# Patient Record
Sex: FEMALE | Race: ASIAN | NOT HISPANIC OR LATINO | URBAN - METROPOLITAN AREA
[De-identification: names, ages, dates, MRNs, and addresses within clinical notes are randomized per-mention and may not be internally consistent; named-entity substitution may affect disease eponyms.]

---

## 2020-10-30 ENCOUNTER — OFFICE VISIT (OUTPATIENT)
Dept: URBAN - METROPOLITAN AREA CLINIC 63 | Facility: CLINIC | Age: 70
End: 2020-10-30

## 2020-11-04 ENCOUNTER — OFFICE VISIT (OUTPATIENT)
Dept: URBAN - METROPOLITAN AREA CLINIC 63 | Facility: CLINIC | Age: 70
End: 2020-11-04

## 2020-11-17 ENCOUNTER — OFFICE VISIT (OUTPATIENT)
Dept: URBAN - METROPOLITAN AREA SURGERY CENTER 4 | Facility: SURGERY CENTER | Age: 70
End: 2020-11-17

## 2020-12-02 ENCOUNTER — OFFICE VISIT (OUTPATIENT)
Dept: URBAN - METROPOLITAN AREA CLINIC 63 | Facility: CLINIC | Age: 70
End: 2020-12-02

## 2021-05-12 ENCOUNTER — OFFICE VISIT (OUTPATIENT)
Dept: URBAN - METROPOLITAN AREA CLINIC 63 | Facility: CLINIC | Age: 71
End: 2021-05-12

## 2021-06-10 ENCOUNTER — OFFICE VISIT (OUTPATIENT)
Dept: URBAN - METROPOLITAN AREA CLINIC 63 | Facility: CLINIC | Age: 71
End: 2021-06-10

## 2021-07-06 ENCOUNTER — OFFICE VISIT (OUTPATIENT)
Dept: URBAN - METROPOLITAN AREA SURGERY CENTER 4 | Facility: SURGERY CENTER | Age: 71
End: 2021-07-06

## 2021-07-22 ENCOUNTER — OFFICE VISIT (OUTPATIENT)
Dept: URBAN - METROPOLITAN AREA CLINIC 63 | Facility: CLINIC | Age: 71
End: 2021-07-22

## 2021-10-08 ENCOUNTER — OFFICE VISIT (OUTPATIENT)
Dept: URBAN - METROPOLITAN AREA SURGERY CENTER 4 | Facility: SURGERY CENTER | Age: 71
End: 2021-10-08

## 2021-10-12 LAB — PATHOLOGY (INDENTED REPORT): (no result)

## 2021-10-22 ENCOUNTER — OFFICE VISIT (OUTPATIENT)
Dept: URBAN - METROPOLITAN AREA CLINIC 63 | Facility: CLINIC | Age: 71
End: 2021-10-22

## 2021-12-22 ENCOUNTER — OFFICE VISIT (OUTPATIENT)
Dept: URBAN - METROPOLITAN AREA CLINIC 63 | Facility: CLINIC | Age: 71
End: 2021-12-22

## 2022-07-09 ENCOUNTER — TELEPHONE ENCOUNTER (OUTPATIENT)
Dept: URBAN - METROPOLITAN AREA CLINIC 121 | Facility: CLINIC | Age: 72
End: 2022-07-09

## 2022-07-09 RX ORDER — OMEPRAZOLE 20 MG/1
CAPSULE, DELAYED RELEASE ORAL
Refills: 0 | OUTPATIENT
Start: 2020-11-04 | End: 2021-06-10

## 2022-07-09 RX ORDER — BISOPROLOL FUMARATE AND HYDROCHLOROTHIAZIDE 2.5; 6.25 MG/1; MG/1
TABLET ORAL
Refills: 0 | OUTPATIENT
Start: 2021-08-31 | End: 2021-10-05

## 2022-07-09 RX ORDER — CALCIUM NO.38/D3/MAG/BORON ASP 500MG/15ML
LIQUID (ML) ORAL
Refills: 0 | OUTPATIENT
Start: 2019-11-19 | End: 2019-11-19

## 2022-07-09 RX ORDER — BISOPROLOL FUMARATE AND HYDROCHLOROTHIAZIDE 2.5; 6.25 MG/1; MG/1
TABLET ORAL ONCE A DAY
Refills: 0 | OUTPATIENT
Start: 2021-10-05 | End: 2021-10-22

## 2022-07-09 RX ORDER — CALCIUM NO.38/D3/MAG/BORON ASP 500MG/15ML
LIQUID (ML) ORAL
Refills: 0 | OUTPATIENT
Start: 2019-07-02 | End: 2019-11-19

## 2022-07-09 RX ORDER — ASPIRIN 81 MG/1
TABLET ORAL ONCE A DAY
Refills: 0 | OUTPATIENT
Start: 2021-10-22 | End: 2021-10-22

## 2022-07-09 RX ORDER — FAMOTIDINE 40 MG/1
USE AS DIRECTED TAKE ONE TABLET WITH DINNER TABLET, FILM COATED ORAL
Refills: 1 | OUTPATIENT
Start: 2021-10-08 | End: 2021-11-30

## 2022-07-09 RX ORDER — ASPIRIN 81 MG/1
TABLET ORAL ONCE A DAY
Refills: 0 | OUTPATIENT
Start: 2021-10-05 | End: 2021-10-22

## 2022-07-09 RX ORDER — METOPROLOL SUCCINATE 25 MG/1
TABLET, EXTENDED RELEASE ORAL
Refills: 0 | OUTPATIENT
Start: 2020-10-11 | End: 2021-06-10

## 2022-07-09 RX ORDER — OMEPRAZOLE 20 MG/1
CAPSULE, DELAYED RELEASE ORAL ONCE A DAY
Refills: 0 | OUTPATIENT
Start: 2021-10-05 | End: 2021-10-22

## 2022-07-09 RX ORDER — OMEGA-3 FATTY ACIDS/FISH OIL 360-1200MG
CAPSULE ORAL ONCE A DAY
Refills: 0 | OUTPATIENT
Start: 2021-10-05 | End: 2021-10-22

## 2022-07-09 RX ORDER — OMEGA-3 FATTY ACIDS/FISH OIL 360-1200MG
CAPSULE ORAL ONCE A DAY
Refills: 0 | OUTPATIENT
Start: 2021-06-10 | End: 2021-10-05

## 2022-07-09 RX ORDER — PANTOPRAZOLE SODIUM 40 MG/1
TABLET, DELAYED RELEASE ORAL
Refills: 0 | OUTPATIENT
Start: 2021-09-26 | End: 2021-10-05

## 2022-07-09 RX ORDER — ATORVASTATIN CALCIUM 10 MG/1
TABLET, FILM COATED ORAL ONCE A DAY
Refills: 0 | OUTPATIENT
Start: 2021-10-05 | End: 2021-10-22

## 2022-07-09 RX ORDER — FLUOCINOLONE ACETONIDE OIL 0.11 MG/ML
OIL AURICULAR (OTIC)
Refills: 0 | OUTPATIENT
Start: 2020-08-11 | End: 2021-06-10

## 2022-07-10 ENCOUNTER — TELEPHONE ENCOUNTER (OUTPATIENT)
Dept: URBAN - METROPOLITAN AREA CLINIC 121 | Facility: CLINIC | Age: 72
End: 2022-07-10

## 2022-07-10 RX ORDER — BISOPROLOL FUMARATE AND HYDROCHLOROTHIAZIDE 2.5; 6.25 MG/1; MG/1
TABLET ORAL ONCE A DAY
Refills: 0 | Status: ACTIVE | COMMUNITY
Start: 2021-10-22

## 2022-07-10 RX ORDER — OMEPRAZOLE 20 MG/1
TAKE ONE CAPSULE PO ONCE DAILY 30 MINUTES BEFORE BREAKFAST CAPSULE, DELAYED RELEASE ORAL
Refills: 1 | Status: ACTIVE | COMMUNITY
Start: 2020-11-04

## 2022-07-10 RX ORDER — SODIUM SULFATE, POTASSIUM SULFATE, MAGNESIUM SULFATE 17.5; 3.13; 1.6 G/ML; G/ML; G/ML
TAKE AS DIRECTED SOLUTION, CONCENTRATE ORAL TAKE AS DIRECTED
Refills: 0 | Status: ACTIVE | COMMUNITY
Start: 2015-09-02

## 2022-07-10 RX ORDER — PANTOPRAZOLE SODIUM 40 MG/1
TABLET, DELAYED RELEASE ORAL ONCE A DAY
Refills: 0 | Status: ACTIVE | COMMUNITY
Start: 2021-10-22

## 2022-07-10 RX ORDER — OMEGA-3 FATTY ACIDS/FISH OIL 360-1200MG
CAPSULE ORAL ONCE A DAY
Refills: 0 | Status: ACTIVE | COMMUNITY
Start: 2021-10-22

## 2022-07-10 RX ORDER — ONDANSETRON HYDROCHLORIDE 4 MG/2ML
TAKE ONE TABLET PO 30 MINUTES BEFORE EACH BOTTLE OF MAGNESIUM CITRATE FOR COLONOSCOPY PREP INJECTION, SOLUTION INTRAMUSCULAR; INTRAVENOUS
Refills: 0 | Status: ACTIVE | COMMUNITY
Start: 2019-08-22

## 2022-07-10 RX ORDER — ONDANSETRON HYDROCHLORIDE 4 MG/1
USE AS DIRECTED TAKE ONE TABLET 30 MINUTES BEFORE EACH BOTTLE OF MAGNESIUM CITRATE FOR COLONOSCOPY PREP TABLET, FILM COATED ORAL
Refills: 0 | Status: ACTIVE | COMMUNITY
Start: 2021-06-15

## 2022-07-10 RX ORDER — ATORVASTATIN CALCIUM 10 MG/1
TABLET, FILM COATED ORAL ONCE A DAY
Refills: 0 | Status: ACTIVE | COMMUNITY
Start: 2021-10-22

## 2022-07-10 RX ORDER — FAMOTIDINE 40 MG/1
USE AS DIRECTED TAKE ONE TABLET WITH DINNER TABLET, FILM COATED ORAL
Refills: 1 | Status: ACTIVE | COMMUNITY
Start: 2021-11-30

## 2022-07-10 RX ORDER — ONDANSETRON HYDROCHLORIDE 4 MG/1
TAKE ONE TABLET 30 MINUTES BEFORE EACH BOTTLE OF MAGNESIUM CITRATE FOR COLONOSCOPY PREP TABLET, FILM COATED ORAL
Refills: 0 | Status: ACTIVE | COMMUNITY
Start: 2020-11-04

## 2022-07-10 RX ORDER — OMEPRAZOLE 20 MG/1
CAPSULE, DELAYED RELEASE ORAL ONCE A DAY
Refills: 0 | Status: ACTIVE | COMMUNITY
Start: 2021-10-22

## 2022-07-30 ENCOUNTER — TELEPHONE ENCOUNTER (OUTPATIENT)
Age: 72
End: 2022-07-30

## 2022-07-31 ENCOUNTER — TELEPHONE ENCOUNTER (OUTPATIENT)
Age: 72
End: 2022-07-31

## 2022-09-13 ENCOUNTER — APPOINTMENT (RX ONLY)
Dept: URBAN - METROPOLITAN AREA CLINIC 116 | Facility: CLINIC | Age: 72
Setting detail: DERMATOLOGY
End: 2022-09-13

## 2022-09-13 DIAGNOSIS — Z71.89 OTHER SPECIFIED COUNSELING: ICD-10-CM

## 2022-09-13 DIAGNOSIS — D22 MELANOCYTIC NEVI: ICD-10-CM

## 2022-09-13 DIAGNOSIS — L21.8 OTHER SEBORRHEIC DERMATITIS: ICD-10-CM

## 2022-09-13 DIAGNOSIS — L81.4 OTHER MELANIN HYPERPIGMENTATION: ICD-10-CM

## 2022-09-13 DIAGNOSIS — I83.9 ASYMPTOMATIC VARICOSE VEINS OF LOWER EXTREMITIES: ICD-10-CM

## 2022-09-13 DIAGNOSIS — L29.89 OTHER PRURITUS: ICD-10-CM

## 2022-09-13 PROBLEM — I83.93 ASYMPTOMATIC VARICOSE VEINS OF BILATERAL LOWER EXTREMITIES: Status: ACTIVE | Noted: 2022-09-13

## 2022-09-13 PROBLEM — L29.8 OTHER PRURITUS: Status: ACTIVE | Noted: 2022-09-13

## 2022-09-13 PROBLEM — D22.5 MELANOCYTIC NEVI OF TRUNK: Status: ACTIVE | Noted: 2022-09-13

## 2022-09-13 PROCEDURE — 99203 OFFICE O/P NEW LOW 30 MIN: CPT

## 2022-09-13 PROCEDURE — ? COUNSELING

## 2022-09-13 PROCEDURE — ? RECOMMENDATIONS

## 2022-09-13 ASSESSMENT — LOCATION DETAILED DESCRIPTION DERM
LOCATION DETAILED: LEFT PROXIMAL DORSAL FOREARM
LOCATION DETAILED: LEFT SUPERIOR MEDIAL UPPER BACK
LOCATION DETAILED: RIGHT CENTRAL EYEBROW
LOCATION DETAILED: LEFT PROXIMAL CALF
LOCATION DETAILED: RIGHT MEDIAL FRONTAL SCALP
LOCATION DETAILED: RIGHT PROXIMAL DORSAL FOREARM
LOCATION DETAILED: LEFT CENTRAL EYEBROW
LOCATION DETAILED: LEFT CENTRAL MALAR CHEEK
LOCATION DETAILED: RIGHT PROXIMAL CALF

## 2022-09-13 ASSESSMENT — LOCATION SIMPLE DESCRIPTION DERM
LOCATION SIMPLE: LEFT EYEBROW
LOCATION SIMPLE: LEFT FOREARM
LOCATION SIMPLE: LEFT CHEEK
LOCATION SIMPLE: RIGHT FOREARM
LOCATION SIMPLE: RIGHT SCALP
LOCATION SIMPLE: RIGHT CALF
LOCATION SIMPLE: RIGHT EYEBROW
LOCATION SIMPLE: LEFT UPPER BACK
LOCATION SIMPLE: LEFT CALF

## 2022-09-13 ASSESSMENT — LOCATION ZONE DERM
LOCATION ZONE: LEG
LOCATION ZONE: FACE
LOCATION ZONE: SCALP
LOCATION ZONE: TRUNK
LOCATION ZONE: ARM

## 2022-09-13 NOTE — PROCEDURE: MIPS QUALITY
Detail Level: Zone
Additional Notes: Patient does not have updated medication list available today
Quality 130: Documentation Of Current Medications In The Medical Record: Documentation of a medical reason(s) for not documenting, updating, or reviewing the patientâs current medications list (e.g., patient is in an urgent or emergent medical situation)

## 2022-09-13 NOTE — PROCEDURE: COUNSELING
Detail Level: Generalized
Skin Checks Recommendations: Watch for any abnormal moles.
Sunscreen Recommendations: Spf 30 to 50
Detail Level: Simple
Detail Level: Zone
Sunscreen Recommendations: SPF with zinc 30- 48
Shampoo Recommendations: Dandruff shampoos

## 2022-09-13 NOTE — PROCEDURE: RECOMMENDATIONS
Recommendations (Free Text): Scalpicin maximum strength topical solution. Apply to dry scalp daily as needed .
Render Risk Assessment In Note?: yes
Recommendation Preamble: The following recommendations were made during visit: moisturizing regularly and exfoliating
Detail Level: Simple
Recommendations (Free Text): Apply Scalpicin daily as needed to affected areas in eyebrows.
Recommendations (Free Text): Recommend patient to see  for further evaluation and treatment.

## 2022-10-18 ENCOUNTER — APPOINTMENT (RX ONLY)
Dept: URBAN - METROPOLITAN AREA CLINIC 116 | Facility: CLINIC | Age: 72
Setting detail: DERMATOLOGY
End: 2022-10-18

## 2022-10-18 DIAGNOSIS — B35.2 TINEA MANUUM: ICD-10-CM

## 2022-10-18 PROBLEM — L08.9 LOCAL INFECTION OF THE SKIN AND SUBCUTANEOUS TISSUE, UNSPECIFIED: Status: ACTIVE | Noted: 2022-10-18

## 2022-10-18 PROCEDURE — ? COUNSELING

## 2022-10-18 PROCEDURE — ? PRESCRIPTION

## 2022-10-18 PROCEDURE — 99213 OFFICE O/P EST LOW 20 MIN: CPT

## 2022-10-18 RX ORDER — CICLOPIROX OLAMINE 7.7 MG/G
CREAM TOPICAL
Qty: 30 | Refills: 0 | Status: ERX | COMMUNITY
Start: 2022-10-18

## 2022-10-18 RX ADMIN — CICLOPIROX OLAMINE: 7.7 CREAM TOPICAL at 00:00

## 2022-10-18 ASSESSMENT — LOCATION DETAILED DESCRIPTION DERM: LOCATION DETAILED: RIGHT THENAR EMINENCE

## 2022-10-18 ASSESSMENT — LOCATION ZONE DERM: LOCATION ZONE: HAND

## 2022-10-18 ASSESSMENT — LOCATION SIMPLE DESCRIPTION DERM: LOCATION SIMPLE: RIGHT HAND

## 2022-11-02 ENCOUNTER — TELEPHONE ENCOUNTER (OUTPATIENT)
Dept: URBAN - METROPOLITAN AREA CLINIC 63 | Facility: CLINIC | Age: 72
End: 2022-11-02

## 2022-11-02 RX ORDER — PANTOPRAZOLE SODIUM 40 MG/1
1 TABLET TABLET, DELAYED RELEASE ORAL ONCE A DAY
Qty: 90 TABLET | Refills: 3
Start: 2021-10-22

## 2022-11-15 RX ORDER — CICLOPIROX OLAMINE 7.7 MG/G
CREAM TOPICAL
Qty: 30 | Refills: 0 | Status: ERX

## 2023-03-21 ENCOUNTER — WEB ENCOUNTER (OUTPATIENT)
Dept: URBAN - METROPOLITAN AREA CLINIC 63 | Facility: CLINIC | Age: 73
End: 2023-03-21

## 2023-03-23 PROBLEM — 302914006: Status: ACTIVE | Noted: 2023-03-23

## 2023-03-23 PROBLEM — 428283002: Status: ACTIVE | Noted: 2023-03-23

## 2023-03-23 PROBLEM — 13200003: Status: ACTIVE | Noted: 2023-03-23

## 2023-03-24 ENCOUNTER — WEB ENCOUNTER (OUTPATIENT)
Dept: URBAN - METROPOLITAN AREA CLINIC 63 | Facility: CLINIC | Age: 73
End: 2023-03-24

## 2023-03-24 ENCOUNTER — LAB OUTSIDE AN ENCOUNTER (OUTPATIENT)
Dept: URBAN - METROPOLITAN AREA CLINIC 63 | Facility: CLINIC | Age: 73
End: 2023-03-24

## 2023-03-24 ENCOUNTER — OFFICE VISIT (OUTPATIENT)
Dept: URBAN - METROPOLITAN AREA CLINIC 63 | Facility: CLINIC | Age: 73
End: 2023-03-24
Payer: MEDICARE

## 2023-03-24 VITALS
DIASTOLIC BLOOD PRESSURE: 74 MMHG | HEART RATE: 90 BPM | WEIGHT: 114.2 LBS | BODY MASS INDEX: 17.92 KG/M2 | SYSTOLIC BLOOD PRESSURE: 110 MMHG | HEIGHT: 67 IN | OXYGEN SATURATION: 98 % | TEMPERATURE: 97.3 F

## 2023-03-24 DIAGNOSIS — K22.70 BARRETT'S ESOPHAGUS WITHOUT DYSPLASIA: ICD-10-CM

## 2023-03-24 DIAGNOSIS — K27.9 PUD (PEPTIC ULCER DISEASE): ICD-10-CM

## 2023-03-24 DIAGNOSIS — Z86.010 PERSONAL HISTORY OF COLONIC POLYPS: ICD-10-CM

## 2023-03-24 PROCEDURE — 99213 OFFICE O/P EST LOW 20 MIN: CPT | Performed by: PHYSICIAN ASSISTANT

## 2023-03-24 RX ORDER — PANTOPRAZOLE SODIUM 40 MG/1
1 TABLET TABLET, DELAYED RELEASE ORAL ONCE A DAY
Qty: 90 TABLET | Refills: 3 | Status: ACTIVE | COMMUNITY
Start: 2021-10-22

## 2023-03-24 RX ORDER — PANTOPRAZOLE SODIUM 40 MG/1
1 TABLET 30 MINUTES BEFORE BREAKFAST TABLET, DELAYED RELEASE ORAL ONCE A DAY
Qty: 90 TABLET | Refills: 3
Start: 2021-10-22

## 2023-03-24 RX ORDER — METOPROLOL TARTRATE 25 MG/1
1 TABLET WITH FOOD TABLET, FILM COATED ORAL TWICE A DAY
Status: ACTIVE | COMMUNITY

## 2023-03-24 RX ORDER — ATORVASTATIN CALCIUM 10 MG/1
TABLET, FILM COATED ORAL ONCE A DAY
Refills: 0 | Status: ACTIVE | COMMUNITY
Start: 2021-10-22

## 2023-03-24 NOTE — HPI-TODAY'S VISIT:
Araceli is a pleasant 72-year-old female who presents today for a follow up.  History of recurrent diverticulitis with microperforation. 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      EGD dated 10/8/2021 demonstrated a regular Z-line.  Gastritis.  Normal duodenum.  One gastric ulcer biopsied in the gastric antrum. Focal minimal chronic gastritis negative for H. pylori.  Reactive gastropathy.  Lower third esophageal biopsies revealed focal intestinal metaplasia compatible with Aparicio's esophagus but negative for dysplasia or malignancy.    Colonoscopy dated 10/8/2021 demonstrated a 5 mm tubular adenoma removed for the mid sigmoid colon.  Diminutive tubular adenoma removed the mid ascending colon.  Diverticulosis in the sigmoid colon and nonbleeding internal hemorrhoids present upon retroflexion     Labs dated 1/9/2023 showed a normal CBC.  PT/INR normal.  Normal creatinine.  Normal LFTs.   Chronic reflux well controlled on pantoprazole 40 mg qd. Notes 1-2 daily bowel movements without constipation or diarrhea. Denies any nausea, vomiting, heartburn, reflux, dysphagia, odynophagia, hematemesis, coffee ground emesis, abdominal pain, change in bowel habits, abnormal weight loss, BRBPR or melena. Denies any family history of colon cancer or colon polyps. Notes no other GI complaints at this time

## 2023-03-31 ENCOUNTER — LAB OUTSIDE AN ENCOUNTER (OUTPATIENT)
Dept: URBAN - METROPOLITAN AREA CLINIC 63 | Facility: CLINIC | Age: 73
End: 2023-03-31

## 2023-05-02 ENCOUNTER — OFFICE VISIT (OUTPATIENT)
Dept: URBAN - METROPOLITAN AREA SURGERY CENTER 4 | Facility: SURGERY CENTER | Age: 73
End: 2023-05-02

## 2023-05-17 ENCOUNTER — APPOINTMENT (RX ONLY)
Dept: URBAN - METROPOLITAN AREA CLINIC 116 | Facility: CLINIC | Age: 73
Setting detail: DERMATOLOGY
End: 2023-05-17

## 2023-05-17 DIAGNOSIS — I87.2 VENOUS INSUFFICIENCY (CHRONIC) (PERIPHERAL): ICD-10-CM | Status: WORSENING

## 2023-05-17 PROCEDURE — ? RECOMMENDATIONS

## 2023-05-17 PROCEDURE — ? MEDICAL CONSULTATION: VENOUS DISEASE

## 2023-05-17 PROCEDURE — 99204 OFFICE O/P NEW MOD 45 MIN: CPT

## 2023-05-17 PROCEDURE — ? COMPRESSION STOCKING FITTING

## 2023-05-17 ASSESSMENT — LOCATION DETAILED DESCRIPTION DERM
LOCATION DETAILED: RIGHT DISTAL PRETIBIAL REGION
LOCATION DETAILED: LEFT PROXIMAL PRETIBIAL REGION

## 2023-05-17 ASSESSMENT — LOCATION ZONE DERM: LOCATION ZONE: LEG

## 2023-05-17 ASSESSMENT — LOCATION SIMPLE DESCRIPTION DERM
LOCATION SIMPLE: LEFT PRETIBIAL REGION
LOCATION SIMPLE: RIGHT PRETIBIAL REGION

## 2023-05-17 NOTE — PROCEDURE: MEDICAL CONSULTATION: VENOUS DISEASE
Include Left Vcss In Note: Yes
Left Leg Circumference: medium
Left Leg: Peripheral Vascular Disease?: No
Right Leg Inflammation: 0- None
Detail Level: Zone
Right Dorsalis Pedis Pulse: 2 (Easily palpable)
Left Leg Venous Hyperpigmentation: 1- Diffuse, but limited in area, and old (brown)
Right Leg Compression Therapy: 0- None or noncompliant
Left Leg Pain: 3- Daily, severe activity limitation or requiring regular analgesic use
Left Leg Varicose Veins: 2- Multiple: GS varicose veins confined to calf or thigh
Follow Up Instructions:: Discussed during visit conservative management that could include any and all of the following:\\n\\nWearing compression stockings during waking hours, Walking frequently (i.e. ten minutes per hour, every waking hour), Analgesic use as needed such as Tylenol or Ibuprofen, Exercise like walking, biking, yoga and running as tolerated, and Weight management (You should consult your primary provider before making any changes).
Right Leg Venous Hyperpigmentation: 0- None or focal low intensity (tan)
Limitations: **Patient reports continuing daily activities / functions with symptoms and is reported to be worse by days end and/or after activities. Patients symptoms include: cramping at night, pain & restless legs.

## 2023-05-17 NOTE — PROCEDURE: COMPRESSION STOCKING FITTING
Left Ankle: 21
Order Date: 05/17/2023
Right Below Knee: 30.5
Detail Level: Simple
Toe Type: Open
Left Thigh: [address]
Pantihose Type: Part A
Right Thigh: 41.5
Left Below Knee: [id number] [facility]
Stocking Type: Stockings
Strength: 20-30 mmHg
Additional Instructions: Only fitted

## 2023-05-17 NOTE — PROCEDURE: RECOMMENDATIONS
Render Risk Assessment In Note?: yes
Detail Level: Zone
Recommendations (Free Text): The patient shows signs and symptoms of chronic venous hypertension, affecting daily activities; patient will schedule for bilateral lower extremity venous ultrasound for next available appointment. \\nPatient will follow up with Dr. Nara Meyer one week after ultrasound is completed. \\nThe patient was fitted for daily graduated compression stocking(s) (20 - 30mmHg or higher) and counseled in conservative management of avoiding prolonged standing, periodic leg elevation, analgesic use, exercise and weight management. \\n\\nPatient was advised to take OTC magnesium for a few weeks to see if the magnesium helps eliminate cramping.
Recommendation Preamble: The following recommendations were made during the visit:
Patient Management Risk Assessment: Moderate

## 2023-05-17 NOTE — HPI: VEIN EVALUATION
Do You Have A Family History Of Vein Disease?: no
Which Leg Is Worse?: Left
Is This A New Presentation, Or A Follow-Up?: Vein Evaluation
Additional History: Patient states her pain is only at night \\nPatient elevates her legs with a pillow nightly which she states helps. \\nPatient states she use to have Migraines but does not have them anymore\\nHistory of AFIB \\nNo history of asthma\\nVein has been present for 30 years \\nPatient has been having pain for 6 months.

## 2023-05-19 ENCOUNTER — OFFICE VISIT (OUTPATIENT)
Dept: URBAN - METROPOLITAN AREA CLINIC 63 | Facility: CLINIC | Age: 73
End: 2023-05-19

## 2023-09-07 ENCOUNTER — LAB OUTSIDE AN ENCOUNTER (OUTPATIENT)
Dept: URBAN - METROPOLITAN AREA CLINIC 63 | Facility: CLINIC | Age: 73
End: 2023-09-07

## 2023-09-07 ENCOUNTER — OFFICE VISIT (OUTPATIENT)
Dept: URBAN - METROPOLITAN AREA CLINIC 63 | Facility: CLINIC | Age: 73
End: 2023-09-07
Payer: MEDICARE

## 2023-09-07 ENCOUNTER — WEB ENCOUNTER (OUTPATIENT)
Dept: URBAN - METROPOLITAN AREA CLINIC 63 | Facility: CLINIC | Age: 73
End: 2023-09-07

## 2023-09-07 VITALS
DIASTOLIC BLOOD PRESSURE: 76 MMHG | TEMPERATURE: 97.1 F | OXYGEN SATURATION: 99 % | SYSTOLIC BLOOD PRESSURE: 130 MMHG | HEIGHT: 67 IN | BODY MASS INDEX: 17.74 KG/M2 | HEART RATE: 79 BPM | WEIGHT: 113 LBS

## 2023-09-07 DIAGNOSIS — K22.70 BARRETT'S ESOPHAGUS WITHOUT DYSPLASIA: ICD-10-CM

## 2023-09-07 DIAGNOSIS — Z86.010 PERSONAL HISTORY OF COLONIC POLYPS: ICD-10-CM

## 2023-09-07 DIAGNOSIS — K27.9 PUD (PEPTIC ULCER DISEASE): ICD-10-CM

## 2023-09-07 PROCEDURE — 99213 OFFICE O/P EST LOW 20 MIN: CPT | Performed by: PHYSICIAN ASSISTANT

## 2023-09-07 RX ORDER — METOPROLOL TARTRATE 25 MG/1
1 TABLET WITH FOOD TABLET, FILM COATED ORAL TWICE A DAY
COMMUNITY

## 2023-09-07 RX ORDER — ATORVASTATIN CALCIUM 10 MG/1
TABLET, FILM COATED ORAL ONCE A DAY
Refills: 0 | COMMUNITY
Start: 2021-10-22

## 2023-09-07 RX ORDER — PANTOPRAZOLE SODIUM 40 MG/1
1 TABLET 30 MINUTES BEFORE BREAKFAST TABLET, DELAYED RELEASE ORAL ONCE A DAY
Qty: 90 TABLET | Refills: 3 | COMMUNITY
Start: 2021-10-22

## 2023-09-07 RX ORDER — PANTOPRAZOLE SODIUM 40 MG/1
1 TABLET 30 MINUTES BEFORE BREAKFAST TABLET, DELAYED RELEASE ORAL ONCE A DAY
Qty: 90 TABLET | Refills: 3
Start: 2021-10-22

## 2023-09-07 NOTE — HPI-TODAY'S VISIT:
Araceli is a pleasant 73-year-old female who presents today for a follow up.  History of recurrent diverticulitis with microperforation. 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      EGD dated 10/8/2021 demonstrated a regular Z-line.  Gastritis.  Normal duodenum.  One gastric ulcer biopsied in the gastric antrum. Focal minimal chronic gastritis negative for H. pylori.  Reactive gastropathy.  Lower third esophageal biopsies revealed focal intestinal metaplasia compatible with Aparicio's esophagus but negative for dysplasia or malignancy.    Colonoscopy dated 10/8/2021 demonstrated a 5 mm tubular adenoma removed for the mid sigmoid colon.  Diminutive tubular adenoma removed the mid ascending colon.  Diverticulosis in the sigmoid colon and nonbleeding internal hemorrhoids present upon retroflexion     Labs dated 1/9/2023 showed a normal CBC.  PT/INR normal.  Normal creatinine.  Normal LFTs.   Chronic reflux well controlled on pantoprazole 40 mg qd. Notes 1-2 daily bowel movements without constipation. Occasional diarrhea dependent on what she eats. Denies any nausea, vomiting, dysphagia, odynophagia, hematemesis, coffee ground emesis, abdominal pain, change in bowel habits, abnormal weight loss, BRBPR or melena. Denies any family history of colon cancer or colon polyps. Notes no other GI complaints at this time

## 2023-09-14 ENCOUNTER — LAB OUTSIDE AN ENCOUNTER (OUTPATIENT)
Dept: URBAN - METROPOLITAN AREA CLINIC 63 | Facility: CLINIC | Age: 73
End: 2023-09-14

## 2023-10-16 ENCOUNTER — TELEPHONE ENCOUNTER (OUTPATIENT)
Dept: URBAN - METROPOLITAN AREA CLINIC 63 | Facility: CLINIC | Age: 73
End: 2023-10-16

## 2023-10-18 ENCOUNTER — TELEPHONE ENCOUNTER (OUTPATIENT)
Dept: URBAN - METROPOLITAN AREA CLINIC 63 | Facility: CLINIC | Age: 73
End: 2023-10-18

## 2023-10-24 ENCOUNTER — OUT OF OFFICE VISIT (OUTPATIENT)
Dept: URBAN - METROPOLITAN AREA SURGERY CENTER 4 | Facility: SURGERY CENTER | Age: 73
End: 2023-10-24
Payer: MEDICARE

## 2023-10-24 ENCOUNTER — CLAIMS CREATED FROM THE CLAIM WINDOW (OUTPATIENT)
Dept: URBAN - METROPOLITAN AREA CLINIC 4 | Facility: CLINIC | Age: 73
End: 2023-10-24
Payer: MEDICARE

## 2023-10-24 DIAGNOSIS — K31.7 GASTRIC POLYP: ICD-10-CM

## 2023-10-24 DIAGNOSIS — K22.89 OTHER SPECIFIED DISEASE OF ESOPHAGUS: ICD-10-CM

## 2023-10-24 DIAGNOSIS — K31.7 POLYP OF STOMACH AND DUODENUM: ICD-10-CM

## 2023-10-24 DIAGNOSIS — K31.A0 GASTRIC INTESTINAL METAPLASIA, UNSPECIFIED: ICD-10-CM

## 2023-10-24 DIAGNOSIS — K29.70 GASTRITIS WITHOUT BLEEDING, UNSPECIFIED CHRONICITY, UNSPECIFIED GASTRITIS TYPE: ICD-10-CM

## 2023-10-24 DIAGNOSIS — Z12.11 COLON CANCER SCREENING (HIGH RISK): ICD-10-CM

## 2023-10-24 DIAGNOSIS — K21.9 GASTRO-ESOPHAGEAL REFLUX DISEASE WITHOUT ESOPHAGITIS: ICD-10-CM

## 2023-10-24 DIAGNOSIS — K29.70 GASTRITIS: ICD-10-CM

## 2023-10-24 DIAGNOSIS — T47.8X5A ADVERSE EFFECT OF OTHER AGENTS PRIMARILY AFFECTING GASTROINTESTINAL SYSTEM, INITIAL ENCOUNTER: ICD-10-CM

## 2023-10-24 DIAGNOSIS — K22.70 BARRETT ESOPHAGUS: ICD-10-CM

## 2023-10-24 PROCEDURE — 88305 TISSUE EXAM BY PATHOLOGIST: CPT | Performed by: PATHOLOGY

## 2023-10-24 PROCEDURE — 43239 EGD BIOPSY SINGLE/MULTIPLE: CPT | Performed by: INTERNAL MEDICINE

## 2023-10-24 PROCEDURE — 88313 SPECIAL STAINS GROUP 2: CPT | Performed by: PATHOLOGY

## 2023-10-24 PROCEDURE — 43239 EGD BIOPSY SINGLE/MULTIPLE: CPT | Performed by: CLINIC/CENTER

## 2023-10-24 PROCEDURE — 88312 SPECIAL STAINS GROUP 1: CPT | Performed by: PATHOLOGY

## 2023-10-24 PROCEDURE — 00731 ANES UPR GI NDSC PX NOS: CPT | Performed by: NURSE ANESTHETIST, CERTIFIED REGISTERED

## 2023-10-24 RX ORDER — ATORVASTATIN CALCIUM 10 MG/1
TABLET, FILM COATED ORAL ONCE A DAY
Refills: 0 | COMMUNITY
Start: 2021-10-22

## 2023-10-24 RX ORDER — PANTOPRAZOLE SODIUM 40 MG/1
1 TABLET 30 MINUTES BEFORE BREAKFAST TABLET, DELAYED RELEASE ORAL ONCE A DAY
Qty: 90 TABLET | Refills: 3 | Status: ACTIVE | COMMUNITY
Start: 2021-10-22

## 2023-10-24 RX ORDER — METOPROLOL TARTRATE 25 MG/1
1 TABLET WITH FOOD TABLET, FILM COATED ORAL TWICE A DAY
COMMUNITY

## 2023-11-08 ENCOUNTER — OFFICE VISIT (OUTPATIENT)
Dept: URBAN - METROPOLITAN AREA CLINIC 63 | Facility: CLINIC | Age: 73
End: 2023-11-08
Payer: MEDICARE

## 2023-11-08 ENCOUNTER — DASHBOARD ENCOUNTERS (OUTPATIENT)
Age: 73
End: 2023-11-08

## 2023-11-08 VITALS
HEIGHT: 67 IN | HEART RATE: 81 BPM | OXYGEN SATURATION: 98 % | WEIGHT: 112 LBS | SYSTOLIC BLOOD PRESSURE: 120 MMHG | TEMPERATURE: 97.2 F | RESPIRATION RATE: 20 BRPM | BODY MASS INDEX: 17.58 KG/M2 | DIASTOLIC BLOOD PRESSURE: 80 MMHG

## 2023-11-08 DIAGNOSIS — K31.A0 GASTRIC INTESTINAL METAPLASIA, UNSPECIFIED: ICD-10-CM

## 2023-11-08 DIAGNOSIS — Z86.010 PERSONAL HISTORY OF COLONIC POLYPS: ICD-10-CM

## 2023-11-08 DIAGNOSIS — K22.70 BARRETT'S ESOPHAGUS WITHOUT DYSPLASIA: ICD-10-CM

## 2023-11-08 PROCEDURE — 99214 OFFICE O/P EST MOD 30 MIN: CPT | Performed by: INTERNAL MEDICINE

## 2023-11-08 RX ORDER — PANTOPRAZOLE SODIUM 20 MG/1
1 TABLET TABLET, DELAYED RELEASE ORAL ONCE A DAY
Qty: 30 | Refills: 3 | OUTPATIENT
Start: 2023-11-08

## 2023-11-08 RX ORDER — PANTOPRAZOLE SODIUM 40 MG/1
1 TABLET 30 MINUTES BEFORE BREAKFAST TABLET, DELAYED RELEASE ORAL ONCE A DAY
Qty: 90 TABLET | Refills: 3 | Status: ACTIVE | COMMUNITY
Start: 2021-10-22

## 2023-11-08 RX ORDER — ATORVASTATIN CALCIUM 10 MG/1
TABLET, FILM COATED ORAL ONCE A DAY
Refills: 0 | Status: ACTIVE | COMMUNITY
Start: 2021-10-22

## 2023-11-08 RX ORDER — METOPROLOL TARTRATE 25 MG/1
1 TABLET WITH FOOD TABLET, FILM COATED ORAL TWICE A DAY
Status: ACTIVE | COMMUNITY

## 2023-11-08 NOTE — HPI-TODAY'S VISIT:
Araceli is a pleasant 73-year-old female who presents today for a procedure follow up.  History of recurrent diverticulitis with microperforation. 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   Last visit noted chronic reflux well-controlled on pantoprazole 40 mg once daily.  Occasional diarrhea depending on what she eats.  Surveillance colonoscopy due in October 2024. EGD dated 10/20/2023 showed esophageal mucosal changes classified as Aparicio's stage C 0 M1 per [place] criteria.  Gastritis.  Single gastric polyp.  Normal duodenum. Path neg for barrets revealing intestinal metaplasia of stomach  EGD dated 10/8/2021 demonstrated a regular Z-line.  Gastritis.  Normal duodenum.  One gastric ulcer biopsied in the gastric antrum. Focal minimal chronic gastritis negative for H. pylori.  Reactive gastropathy.  Lower third esophageal biopsies revealed focal intestinal metaplasia compatible with Aparicio's esophagus but negative for dysplasia or malignancy.    Colonoscopy dated 10/8/2021 demonstrated a 5 mm tubular adenoma removed for the mid sigmoid colon.  Diminutive tubular adenoma removed the mid ascending colon.  Diverticulosis in the sigmoid colon and nonbleeding internal hemorrhoids present upon retroflexion   Labs dated 1/9/2023 showed a normal CBC.  PT/INR normal.  Normal creatinine.  Normal LFTs.  Patient states doing well denies , any rectal bleeding, constipation, diarrhea, hearburn,abd pain, wt loss or melena

## 2024-01-26 ENCOUNTER — ERX REFILL RESPONSE (OUTPATIENT)
Dept: URBAN - METROPOLITAN AREA CLINIC 63 | Facility: CLINIC | Age: 74
End: 2024-01-26

## 2024-01-26 RX ORDER — PANTOPRAZOLE SODIUM 20 MG/1
1 TABLET TABLET, DELAYED RELEASE ORAL ONCE A DAY
Qty: 30 | Refills: 3 | OUTPATIENT

## 2024-05-16 ENCOUNTER — TELEPHONE ENCOUNTER (OUTPATIENT)
Dept: URBAN - METROPOLITAN AREA CLINIC 60 | Facility: CLINIC | Age: 74
End: 2024-05-16

## 2024-06-19 ENCOUNTER — TELEPHONE ENCOUNTER (OUTPATIENT)
Dept: URBAN - METROPOLITAN AREA CLINIC 64 | Facility: CLINIC | Age: 74
End: 2024-06-19

## 2024-07-30 ENCOUNTER — TELEPHONE ENCOUNTER (OUTPATIENT)
Dept: URBAN - METROPOLITAN AREA CLINIC 60 | Facility: CLINIC | Age: 74
End: 2024-07-30

## 2024-08-08 ENCOUNTER — TELEPHONE ENCOUNTER (OUTPATIENT)
Dept: URBAN - METROPOLITAN AREA CLINIC 64 | Facility: CLINIC | Age: 74
End: 2024-08-08

## 2024-09-17 ENCOUNTER — OFFICE VISIT (OUTPATIENT)
Dept: URBAN - METROPOLITAN AREA CLINIC 63 | Facility: CLINIC | Age: 74
End: 2024-09-17

## 2024-10-30 ENCOUNTER — OFFICE VISIT (OUTPATIENT)
Dept: URBAN - METROPOLITAN AREA CLINIC 63 | Facility: CLINIC | Age: 74
End: 2024-10-30

## 2024-11-07 ENCOUNTER — LAB OUTSIDE AN ENCOUNTER (OUTPATIENT)
Dept: URBAN - METROPOLITAN AREA CLINIC 63 | Facility: CLINIC | Age: 74
End: 2024-11-07

## 2024-11-07 ENCOUNTER — OFFICE VISIT (OUTPATIENT)
Dept: URBAN - METROPOLITAN AREA CLINIC 63 | Facility: CLINIC | Age: 74
End: 2024-11-07
Payer: MEDICARE

## 2024-11-07 VITALS
BODY MASS INDEX: 17.64 KG/M2 | WEIGHT: 112.4 LBS | OXYGEN SATURATION: 98 % | DIASTOLIC BLOOD PRESSURE: 84 MMHG | HEART RATE: 97 BPM | TEMPERATURE: 97.2 F | SYSTOLIC BLOOD PRESSURE: 134 MMHG | HEIGHT: 67 IN

## 2024-11-07 DIAGNOSIS — Z78.9 MODERATE ALCOHOL CONSUMPTION: ICD-10-CM

## 2024-11-07 DIAGNOSIS — K22.70 BARRETT'S ESOPHAGUS WITHOUT DYSPLASIA: ICD-10-CM

## 2024-11-07 DIAGNOSIS — Z86.0100 HISTORY OF COLON POLYPS: ICD-10-CM

## 2024-11-07 DIAGNOSIS — R10.821 RIGHT UPPER QUADRANT ABDOMINAL TENDERNESS WITH REBOUND TENDERNESS: ICD-10-CM

## 2024-11-07 DIAGNOSIS — K31.A0 GASTRIC INTESTINAL METAPLASIA, UNSPECIFIED: ICD-10-CM

## 2024-11-07 PROBLEM — 448265002: Status: ACTIVE | Noted: 2024-11-07

## 2024-11-07 PROBLEM — 43783005: Status: ACTIVE | Noted: 2024-11-07

## 2024-11-07 PROCEDURE — 99214 OFFICE O/P EST MOD 30 MIN: CPT | Performed by: INTERNAL MEDICINE

## 2024-11-07 RX ORDER — ATORVASTATIN CALCIUM 10 MG/1
TABLET, FILM COATED ORAL ONCE A DAY
Refills: 0 | Status: ACTIVE | COMMUNITY
Start: 2021-10-22

## 2024-11-07 RX ORDER — PANTOPRAZOLE SODIUM 40 MG/1
1 TABLET 30 MINUTES BEFORE BREAKFAST TABLET, DELAYED RELEASE ORAL ONCE A DAY
Qty: 90 TABLET | Refills: 3 | Status: ACTIVE | COMMUNITY
Start: 2021-10-22

## 2024-11-07 RX ORDER — METOPROLOL TARTRATE 25 MG/1
1 TABLET WITH FOOD TABLET, FILM COATED ORAL TWICE A DAY
Status: ACTIVE | COMMUNITY

## 2024-11-07 RX ORDER — PANTOPRAZOLE SODIUM 20 MG/1
1 TABLET TABLET, DELAYED RELEASE ORAL ONCE A DAY
Qty: 30 | Refills: 3 | Status: ACTIVE | COMMUNITY

## 2024-11-07 RX ORDER — PANTOPRAZOLE SODIUM 40 MG/1
1 TABLET TABLET, DELAYED RELEASE ORAL ONCE A DAY
Qty: 90 TABLET | Refills: 1 | OUTPATIENT

## 2024-11-07 NOTE — HPI-PREVIOUS PROCEDURES
EGD dated 10/20/2023   showed esophageal mucosal changes classified as Aparicio's stage C 0 M1 per [place] criteria. Gastritis. Single gastric polyp. Normal duodenum. Path neg for barrets revealing intestinal metaplasia of stomach  EGD dated 10/8/2021 demonstrated a regular Z-line. Gastritis. Normal duodenum. One gastric ulcer biopsied in the gastric antrum. Focal minimal chronic gastritis negative for H. pylori. Reactive gastropathy. Lower third esophageal biopsies revealed focal intestinal metaplasia compatible with Aparicio's esophagus but negative for dysplasia or malignancy.    Colonoscopy dated 10/8/2021 demonstrated a 5 mm tubular adenoma removed for the mid sigmoid colon. Diminutive tubular adenoma removed the mid ascending colon. 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

## 2024-11-07 NOTE — HPI-TODAY'S VISIT:
Araceli is a pleasant 74-year-old female who presents today for a procedure follow up.  History of recurrent diverticulitis with microperforation. 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  has a history of short segment Aparicio's esophagus, gastric intestinal metaplasia H. pylori negative,  nicotine dependence and moderate alcohol use who is here today in follow-up  Chronic reflux well-controlled on pantoprazole 40 mg once daily.  Occasional diarrhea depending on what she eats.  Surveillance colonoscopy due in October 2024. She wishes to get her pantoprazole refilled. She is asymptomatic. Reports no significant breakthrough symptoms on the pantoprazole 40 mg daily. Denies dysphagia denies early satiety or abdominal pain or bloating Smokes a few cigarettes daily reports moderate alcohol use. Denies any constipation diarrhea rectal bleeding or melena or any unintentional weight loss loss of appetite Recent labs in September unremarkable-no anemia. Normal liver enzymes.

## 2024-11-07 NOTE — HPI-PREVIOUS LABS
Labs September 2024: WBC 7.3, hemoglobin 14.5 g MCV 98, platelets 246 Normal electrolytes, BUN 13 creatinine 0.75, serum albumin 4.8, normal liver enzymes Normal lipid panel,    Labs dated 1/9/2023 showed a normal CBC. PT/INR normal. Normal creatinine. Normal LFTs. Type 2 diabetes mellitus with hyperglycemia, with long-term current use of insulin (CMD)  (primary encounter diagnosis)  Plan: POCT Glycohemoglobin Analyzer, Semaglutide, 1         MG/DOSE, (Ozempic, 1 MG/DOSE,) 4 MG/3ML         Solution Pen-injector, Insulin Aspart FlexPen         (NovoLOG FLEXPEN) 100 UNIT/ML pen-injector,         Lipid Panel With Reflex, Comprehensive         Metabolic Panel    Acquired hypothyroidism    Essential hypertension    Chronic right shoulder pain  Plan: SERVICE TO ORTHOPEDICS, XR SHOULDER 3 VIEWS         RIGHT    Screening for prostate cancer  Plan: PSA    Colon cancer screening  Plan: OPEN ACCESS COLONOSCOPY

## 2024-11-14 ENCOUNTER — LAB OUTSIDE AN ENCOUNTER (OUTPATIENT)
Dept: URBAN - METROPOLITAN AREA CLINIC 63 | Facility: CLINIC | Age: 74
End: 2024-11-14

## 2024-12-02 ENCOUNTER — LAB OUTSIDE AN ENCOUNTER (OUTPATIENT)
Dept: URBAN - METROPOLITAN AREA CLINIC 63 | Facility: CLINIC | Age: 74
End: 2024-12-02

## 2024-12-05 ENCOUNTER — OFFICE VISIT (OUTPATIENT)
Dept: URBAN - METROPOLITAN AREA CLINIC 61 | Facility: CLINIC | Age: 74
End: 2024-12-05
Payer: MEDICARE

## 2024-12-05 ENCOUNTER — TELEPHONE ENCOUNTER (OUTPATIENT)
Dept: URBAN - METROPOLITAN AREA CLINIC 63 | Facility: CLINIC | Age: 74
End: 2024-12-05

## 2024-12-05 DIAGNOSIS — Z78.9 MODERATE ALCOHOL CONSUMPTION: ICD-10-CM

## 2024-12-05 PROCEDURE — 76981 USE PARENCHYMA: CPT | Performed by: INTERNAL MEDICINE

## 2024-12-05 RX ORDER — PANTOPRAZOLE SODIUM 40 MG/1
1 TABLET 30 MINUTES BEFORE BREAKFAST TABLET, DELAYED RELEASE ORAL ONCE A DAY
Qty: 90 TABLET | Refills: 3 | Status: ACTIVE | COMMUNITY
Start: 2021-10-22

## 2024-12-05 RX ORDER — PANTOPRAZOLE SODIUM 20 MG/1
1 TABLET TABLET, DELAYED RELEASE ORAL ONCE A DAY
Qty: 30 | Refills: 3 | Status: ACTIVE | COMMUNITY

## 2024-12-05 RX ORDER — METOPROLOL TARTRATE 25 MG/1
1 TABLET WITH FOOD TABLET, FILM COATED ORAL TWICE A DAY
Status: ACTIVE | COMMUNITY

## 2024-12-05 RX ORDER — ATORVASTATIN CALCIUM 10 MG/1
TABLET, FILM COATED ORAL ONCE A DAY
Refills: 0 | Status: ACTIVE | COMMUNITY
Start: 2021-10-22

## 2024-12-05 RX ORDER — PANTOPRAZOLE SODIUM 40 MG/1
1 TABLET TABLET, DELAYED RELEASE ORAL ONCE A DAY
Qty: 90 TABLET | Refills: 1 | Status: ACTIVE | COMMUNITY

## 2025-04-07 ENCOUNTER — OFFICE VISIT (OUTPATIENT)
Dept: URBAN - METROPOLITAN AREA SURGERY CENTER 4 | Facility: SURGERY CENTER | Age: 75
End: 2025-04-07

## 2025-05-02 ENCOUNTER — OFFICE VISIT (OUTPATIENT)
Dept: URBAN - METROPOLITAN AREA CLINIC 63 | Facility: CLINIC | Age: 75
End: 2025-05-02

## 2025-05-21 ENCOUNTER — ERX REFILL RESPONSE (OUTPATIENT)
Dept: URBAN - METROPOLITAN AREA CLINIC 63 | Facility: CLINIC | Age: 75
End: 2025-05-21

## 2025-05-21 RX ORDER — PANTOPRAZOLE SODIUM 40 MG/1
TAKE ONE TABLET BY MOUTH ONE TIME DAILY TABLET, DELAYED RELEASE ORAL
Qty: 90 TABLET | Refills: 1 | OUTPATIENT

## 2025-05-21 RX ORDER — PANTOPRAZOLE SODIUM 40 MG/1
1 TABLET TABLET, DELAYED RELEASE ORAL ONCE A DAY
Qty: 90 TABLET | Refills: 1 | OUTPATIENT